# Patient Record
Sex: MALE | Race: WHITE | Employment: UNEMPLOYED | URBAN - NONMETROPOLITAN AREA
[De-identification: names, ages, dates, MRNs, and addresses within clinical notes are randomized per-mention and may not be internally consistent; named-entity substitution may affect disease eponyms.]

---

## 2023-11-02 ENCOUNTER — APPOINTMENT (OUTPATIENT)
Dept: CT IMAGING | Age: 38
End: 2023-11-02
Payer: MEDICAID

## 2023-11-02 ENCOUNTER — HOSPITAL ENCOUNTER (EMERGENCY)
Age: 38
Discharge: HOME | End: 2023-11-02
Attending: EMERGENCY MEDICINE
Payer: MEDICAID

## 2023-11-02 ENCOUNTER — APPOINTMENT (OUTPATIENT)
Dept: ULTRASOUND IMAGING | Age: 38
End: 2023-11-02
Payer: MEDICAID

## 2023-11-02 VITALS
RESPIRATION RATE: 18 BRPM | OXYGEN SATURATION: 99 % | TEMPERATURE: 98.4 F | HEIGHT: 72 IN | DIASTOLIC BLOOD PRESSURE: 67 MMHG | HEART RATE: 102 BPM | WEIGHT: 205 LBS | SYSTOLIC BLOOD PRESSURE: 119 MMHG | BODY MASS INDEX: 27.77 KG/M2

## 2023-11-02 DIAGNOSIS — Z77.29 EXPOSURE TO METHAMPHETAMINE: ICD-10-CM

## 2023-11-02 DIAGNOSIS — R07.9 CHEST PAIN, UNSPECIFIED TYPE: Primary | ICD-10-CM

## 2023-11-02 DIAGNOSIS — R17 HIGH SERUM TOTAL BILIRUBIN: ICD-10-CM

## 2023-11-02 DIAGNOSIS — F12.10 CANNABIS ABUSE: ICD-10-CM

## 2023-11-02 LAB
ALBUMIN SERPL BCG-MCNC: 4.9 G/DL (ref 3.5–5.1)
ALP SERPL-CCNC: 64 U/L (ref 38–126)
ALT SERPL W/O P-5'-P-CCNC: 30 U/L (ref 11–66)
AMPHETAMINES UR QL SCN: POSITIVE
ANION GAP SERPL CALC-SCNC: 19 MEQ/L (ref 8–16)
APAP SERPL-MCNC: < 5 UG/ML (ref 0–20)
AST SERPL-CCNC: 24 U/L (ref 5–40)
BARBITURATES UR QL SCN: NEGATIVE
BASOPHILS ABSOLUTE: 0 THOU/MM3 (ref 0–0.1)
BASOPHILS NFR BLD AUTO: 0.4 %
BENZODIAZ UR QL SCN: NEGATIVE
BILIRUB CONJ SERPL-MCNC: 0.4 MG/DL (ref 0–0.3)
BILIRUB SERPL-MCNC: 3.1 MG/DL (ref 0.3–1.2)
BILIRUB UR QL STRIP: NEGATIVE
BUN SERPL-MCNC: 22 MG/DL (ref 7–22)
BZE UR QL SCN: NEGATIVE
CALCIUM SERPL-MCNC: 10.2 MG/DL (ref 8.5–10.5)
CANNABINOIDS UR QL SCN: POSITIVE
CHARACTER UR: CLEAR
CHLORIDE SERPL-SCNC: 99 MEQ/L (ref 98–111)
CO2 SERPL-SCNC: 22 MEQ/L (ref 23–33)
COLOR UR: YELLOW
CREAT SERPL-MCNC: 1 MG/DL (ref 0.4–1.2)
D DIMER PPP IA.FEU-MCNC: 383 NG/ML FEU (ref 0–500)
DEPRECATED RDW RBC AUTO: 38.5 FL (ref 35–45)
EOSINOPHIL NFR BLD AUTO: 0.6 %
EOSINOPHILS ABSOLUTE: 0.1 THOU/MM3 (ref 0–0.4)
ERYTHROCYTE [DISTWIDTH] IN BLOOD BY AUTOMATED COUNT: 12.7 % (ref 11.5–14.5)
ETHANOL SERPL-MCNC: < 0.01 %
FENTANYL: NEGATIVE
GFR SERPL CREATININE-BSD FRML MDRD: > 60 ML/MIN/1.73M2
GLUCOSE BLD STRIP.AUTO-MCNC: 121 MG/DL (ref 70–108)
GLUCOSE BLD-MCNC: 121 MG/DL
GLUCOSE SERPL-MCNC: 122 MG/DL (ref 70–108)
GLUCOSE UR QL STRIP.AUTO: NEGATIVE MG/DL
HCT VFR BLD AUTO: 50.1 % (ref 42–52)
HGB BLD-MCNC: 17.4 GM/DL (ref 14–18)
HGB UR QL STRIP.AUTO: NEGATIVE
IMM GRANULOCYTES # BLD AUTO: 0.04 THOU/MM3 (ref 0–0.07)
IMM GRANULOCYTES NFR BLD AUTO: 0.4 %
KETONES UR QL STRIP.AUTO: 40
LACTATE SERPL-SCNC: 1 MMOL/L (ref 0.5–2)
LEUKOCYTE ESTERASE UR QL STRIP.AUTO: NEGATIVE
LIPASE SERPL-CCNC: 28 U/L (ref 5.6–51.3)
LYMPHOCYTES ABSOLUTE: 1.9 THOU/MM3 (ref 1–4.8)
LYMPHOCYTES NFR BLD AUTO: 17.3 %
MCH RBC QN AUTO: 29.1 PG (ref 26–33)
MCHC RBC AUTO-ENTMCNC: 34.7 GM/DL (ref 32.2–35.5)
MCV RBC AUTO: 83.8 FL (ref 80–94)
MONOCYTES ABSOLUTE: 0.9 THOU/MM3 (ref 0.4–1.3)
MONOCYTES NFR BLD AUTO: 7.9 %
NEUTROPHILS NFR BLD AUTO: 73.4 %
NITRITE UR QL STRIP.AUTO: NEGATIVE
NRBC BLD AUTO-RTO: 0 /100 WBC
OPIATES UR QL SCN: NEGATIVE
OSMOLALITY SERPL CALC.SUM OF ELEC: 284 MOSMOL/KG (ref 275–300)
OXYCODONE: NEGATIVE
PCP UR QL SCN: NEGATIVE
PH UR STRIP.AUTO: 6 [PH] (ref 5–9)
PLATELET # BLD AUTO: 392 THOU/MM3 (ref 130–400)
PMV BLD AUTO: 9.4 FL (ref 9.4–12.4)
POTASSIUM SERPL-SCNC: 3.7 MEQ/L (ref 3.5–5.2)
PROT SERPL-MCNC: 8 G/DL (ref 6.1–8)
PROT UR STRIP.AUTO-MCNC: NEGATIVE MG/DL
RBC # BLD AUTO: 5.98 MILL/MM3 (ref 4.7–6.1)
RPR SER QL: NONREACTIVE
SALICYLATES SERPL-MCNC: < 0.3 MG/DL (ref 2–10)
SEGMENTED NEUTROPHILS ABSOLUTE COUNT: 8.1 THOU/MM3 (ref 1.8–7.7)
SODIUM SERPL-SCNC: 140 MEQ/L (ref 135–145)
SP GR UR REFRACT.AUTO: 1.02 (ref 1–1.03)
TROPONIN, HIGH SENSITIVITY: 6 NG/L (ref 0–12)
UROBILINOGEN UR QL STRIP.AUTO: 1 EU/DL (ref 0–1)
WBC # BLD AUTO: 11.1 THOU/MM3 (ref 4.8–10.8)

## 2023-11-02 PROCEDURE — 76705 ECHO EXAM OF ABDOMEN: CPT

## 2023-11-02 PROCEDURE — 82948 REAGENT STRIP/BLOOD GLUCOSE: CPT

## 2023-11-02 PROCEDURE — 86592 SYPHILIS TEST NON-TREP QUAL: CPT

## 2023-11-02 PROCEDURE — 82077 ASSAY SPEC XCP UR&BREATH IA: CPT

## 2023-11-02 PROCEDURE — 74177 CT ABD & PELVIS W/CONTRAST: CPT

## 2023-11-02 PROCEDURE — 96361 HYDRATE IV INFUSION ADD-ON: CPT

## 2023-11-02 PROCEDURE — 2580000003 HC RX 258: Performed by: EMERGENCY MEDICINE

## 2023-11-02 PROCEDURE — 83690 ASSAY OF LIPASE: CPT

## 2023-11-02 PROCEDURE — 80143 DRUG ASSAY ACETAMINOPHEN: CPT

## 2023-11-02 PROCEDURE — 81003 URINALYSIS AUTO W/O SCOPE: CPT

## 2023-11-02 PROCEDURE — 83605 ASSAY OF LACTIC ACID: CPT

## 2023-11-02 PROCEDURE — 85379 FIBRIN DEGRADATION QUANT: CPT

## 2023-11-02 PROCEDURE — 80179 DRUG ASSAY SALICYLATE: CPT

## 2023-11-02 PROCEDURE — 84484 ASSAY OF TROPONIN QUANT: CPT

## 2023-11-02 PROCEDURE — 36415 COLL VENOUS BLD VENIPUNCTURE: CPT

## 2023-11-02 PROCEDURE — 99285 EMERGENCY DEPT VISIT HI MDM: CPT

## 2023-11-02 PROCEDURE — 96374 THER/PROPH/DIAG INJ IV PUSH: CPT

## 2023-11-02 PROCEDURE — 6360000002 HC RX W HCPCS: Performed by: EMERGENCY MEDICINE

## 2023-11-02 PROCEDURE — 93005 ELECTROCARDIOGRAM TRACING: CPT | Performed by: EMERGENCY MEDICINE

## 2023-11-02 PROCEDURE — 85025 COMPLETE CBC W/AUTO DIFF WBC: CPT

## 2023-11-02 PROCEDURE — 80307 DRUG TEST PRSMV CHEM ANLYZR: CPT

## 2023-11-02 PROCEDURE — 82248 BILIRUBIN DIRECT: CPT

## 2023-11-02 PROCEDURE — 80074 ACUTE HEPATITIS PANEL: CPT

## 2023-11-02 PROCEDURE — 6360000004 HC RX CONTRAST MEDICATION

## 2023-11-02 PROCEDURE — 80053 COMPREHEN METABOLIC PANEL: CPT

## 2023-11-02 RX ORDER — 0.9 % SODIUM CHLORIDE 0.9 %
1000 INTRAVENOUS SOLUTION INTRAVENOUS ONCE
Status: COMPLETED | OUTPATIENT
Start: 2023-11-02 | End: 2023-11-02

## 2023-11-02 RX ORDER — LORAZEPAM 2 MG/ML
1 INJECTION INTRAMUSCULAR EVERY 6 HOURS PRN
Status: DISCONTINUED | OUTPATIENT
Start: 2023-11-02 | End: 2023-11-02 | Stop reason: HOSPADM

## 2023-11-02 RX ADMIN — SODIUM CHLORIDE 1000 ML: 9 INJECTION, SOLUTION INTRAVENOUS at 10:57

## 2023-11-02 RX ADMIN — IOPAMIDOL 80 ML: 755 INJECTION, SOLUTION INTRAVENOUS at 16:36

## 2023-11-02 RX ADMIN — LORAZEPAM 1 MG: 2 INJECTION INTRAMUSCULAR; INTRAVENOUS at 10:56

## 2023-11-02 ASSESSMENT — PAIN - FUNCTIONAL ASSESSMENT
PAIN_FUNCTIONAL_ASSESSMENT: NONE - DENIES PAIN

## 2023-11-02 NOTE — ED NOTES
Patient resting in bed, respirations even and unlabored at this time. Patient denies nay pain and is updated on plan of care a this time.       Devyn Reeves RN  11/02/23 3984

## 2023-11-02 NOTE — ED NOTES
Patient resting in bed, respirations even and unlabored at this time. Patient denies any pain at this time.       Mirela Duff RN  11/02/23 1233
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
## 2023-11-02 NOTE — DISCHARGE INSTRUCTIONS
You were seen today in the ED chest pain, numbness,     You were seen to have abnormal labs that obtain to your liver. You need to have more imaging for your liver. Please follow up with family medicine tomorrow morning at 10am to follow up to get imaging scheduled as well as medications and establish care.

## 2023-11-02 NOTE — ED NOTES
Patient heart rate back to 80 BPM at this time and patient states he feels like the episode of tachycardia is over, and that he can breathe better now. Respirations even and unlabored at this time.       Praneeth Duarte RN  11/02/23 5744

## 2023-11-02 NOTE — ED NOTES
Pt resting on cot. Pt still unable to provide urine specimen. Vitals stable. Family at bedside.       Juanito Mcgowan RN  11/02/23 7209

## 2023-11-02 NOTE — ED NOTES
Patient resting in bed, respirations even and unlabored at this time. Patient denies any pain and ultrasound team at bedside at this time.       Sonya Willett RN  11/02/23 2182

## 2023-11-02 NOTE — ED NOTES
Pt reassessed at this time. Resting on cot. Vitals stable. Call light in reach.       Shama Barron RN  11/02/23 3698

## 2023-11-02 NOTE — ED NOTES
Ambulatory pulse of 99% and  at this time. Patient tolerates ambulation well.      Bell Ely RN  11/02/23 1179

## 2023-11-02 NOTE — ED NOTES
Patient resting in bed and updated on plan of care and ultrasound process at this time. No signs of distress noted.       Kathryn Ann RN  11/02/23 4164

## 2023-11-02 NOTE — ED NOTES
Patient heart rate 130 at this time and states he feels out of breath and that his heart is pulsing.      Patience Woods RN  11/02/23 6240

## 2023-11-03 ENCOUNTER — OFFICE VISIT (OUTPATIENT)
Dept: FAMILY MEDICINE CLINIC | Age: 38
End: 2023-11-03
Payer: MEDICAID

## 2023-11-03 VITALS
TEMPERATURE: 97.9 F | BODY MASS INDEX: 25.17 KG/M2 | HEIGHT: 72 IN | HEART RATE: 83 BPM | SYSTOLIC BLOOD PRESSURE: 118 MMHG | DIASTOLIC BLOOD PRESSURE: 72 MMHG | OXYGEN SATURATION: 98 % | RESPIRATION RATE: 18 BRPM | WEIGHT: 185.8 LBS

## 2023-11-03 DIAGNOSIS — R07.9 CHEST PAIN, UNSPECIFIED TYPE: ICD-10-CM

## 2023-11-03 DIAGNOSIS — I51.7 LVH (LEFT VENTRICULAR HYPERTROPHY): ICD-10-CM

## 2023-11-03 DIAGNOSIS — R06.02 SHORTNESS OF BREATH: ICD-10-CM

## 2023-11-03 DIAGNOSIS — F31.9 BIPOLAR I DISORDER (HCC): ICD-10-CM

## 2023-11-03 DIAGNOSIS — F41.9 ANXIETY: Primary | ICD-10-CM

## 2023-11-03 LAB
EKG ATRIAL RATE: 111 BPM
EKG P AXIS: -17 DEGREES
EKG P-R INTERVAL: 138 MS
EKG Q-T INTERVAL: 342 MS
EKG QRS DURATION: 108 MS
EKG QTC CALCULATION (BAZETT): 465 MS
EKG R AXIS: -24 DEGREES
EKG T AXIS: -8 DEGREES
EKG VENTRICULAR RATE: 111 BPM
HAV IGM SER QL: NEGATIVE
HBV CORE IGM SERPL QL IA: NEGATIVE
HBV SURFACE AG SERPL QL IA: NEGATIVE
HCV IGG SERPL QL IA: NEGATIVE

## 2023-11-03 PROCEDURE — 99204 OFFICE O/P NEW MOD 45 MIN: CPT

## 2023-11-03 SDOH — ECONOMIC STABILITY: FOOD INSECURITY: WITHIN THE PAST 12 MONTHS, YOU WORRIED THAT YOUR FOOD WOULD RUN OUT BEFORE YOU GOT MONEY TO BUY MORE.: NEVER TRUE

## 2023-11-03 SDOH — ECONOMIC STABILITY: HOUSING INSECURITY
IN THE LAST 12 MONTHS, WAS THERE A TIME WHEN YOU DID NOT HAVE A STEADY PLACE TO SLEEP OR SLEPT IN A SHELTER (INCLUDING NOW)?: YES

## 2023-11-03 SDOH — ECONOMIC STABILITY: FOOD INSECURITY: WITHIN THE PAST 12 MONTHS, THE FOOD YOU BOUGHT JUST DIDN'T LAST AND YOU DIDN'T HAVE MONEY TO GET MORE.: NEVER TRUE

## 2023-11-03 SDOH — ECONOMIC STABILITY: INCOME INSECURITY: HOW HARD IS IT FOR YOU TO PAY FOR THE VERY BASICS LIKE FOOD, HOUSING, MEDICAL CARE, AND HEATING?: SOMEWHAT HARD

## 2023-11-03 ASSESSMENT — PATIENT HEALTH QUESTIONNAIRE - PHQ9
3. TROUBLE FALLING OR STAYING ASLEEP: 2
SUM OF ALL RESPONSES TO PHQ QUESTIONS 1-9: 17
2. FEELING DOWN, DEPRESSED OR HOPELESS: 2
8. MOVING OR SPEAKING SO SLOWLY THAT OTHER PEOPLE COULD HAVE NOTICED. OR THE OPPOSITE, BEING SO FIGETY OR RESTLESS THAT YOU HAVE BEEN MOVING AROUND A LOT MORE THAN USUAL: 2
9. THOUGHTS THAT YOU WOULD BE BETTER OFF DEAD, OR OF HURTING YOURSELF: 2
5. POOR APPETITE OR OVEREATING: 2
SUM OF ALL RESPONSES TO PHQ QUESTIONS 1-9: 15
1. LITTLE INTEREST OR PLEASURE IN DOING THINGS: 2
4. FEELING TIRED OR HAVING LITTLE ENERGY: 3
10. IF YOU CHECKED OFF ANY PROBLEMS, HOW DIFFICULT HAVE THESE PROBLEMS MADE IT FOR YOU TO DO YOUR WORK, TAKE CARE OF THINGS AT HOME, OR GET ALONG WITH OTHER PEOPLE: 2
SUM OF ALL RESPONSES TO PHQ QUESTIONS 1-9: 17
SUM OF ALL RESPONSES TO PHQ9 QUESTIONS 1 & 2: 4
6. FEELING BAD ABOUT YOURSELF - OR THAT YOU ARE A FAILURE OR HAVE LET YOURSELF OR YOUR FAMILY DOWN: 2
SUM OF ALL RESPONSES TO PHQ QUESTIONS 1-9: 17
7. TROUBLE CONCENTRATING ON THINGS, SUCH AS READING THE NEWSPAPER OR WATCHING TELEVISION: 0

## 2023-11-03 ASSESSMENT — COLUMBIA-SUICIDE SEVERITY RATING SCALE - C-SSRS
2. HAVE YOU ACTUALLY HAD ANY THOUGHTS OF KILLING YOURSELF?: YES
3. HAVE YOU BEEN THINKING ABOUT HOW YOU MIGHT KILL YOURSELF?: NO
1. WITHIN THE PAST MONTH, HAVE YOU WISHED YOU WERE DEAD OR WISHED YOU COULD GO TO SLEEP AND NOT WAKE UP?: NO

## 2023-11-03 NOTE — PROGRESS NOTES
I saw and evaluated the patient, performing the key elements of the service. I discussed the findings, assessment and plan with the resident and agree with the resident's findings and plan as documented in the resident's note. GC modifier added.

## 2023-11-03 NOTE — PROGRESS NOTES
Carmelita Velasco is a 41 y.o. male who presents today for:  Chief Complaint   Patient presents with    New Patient     Chest pain, lower back pain that started today. Occasionally has numbness in finger tips and face. Pt girlfriend had to rub his chest to \"bring him back\". ER says they are concerned of liver, looks to be cancerous and kidney concerns      HPI:   Carmelita Velasco is 41 y.o. who presents today to establish care in the office today. He moved to [place] from [place] 3 weeks ago. He reports having a significant history of psychiatric conditions for which he was being treated in the past.  However, reports that all medical care was withdrawn about 2 years ago as he was told\" that he had too much information about his conditions and so did not need treatment anymore\". Past psych hx includes: significant hx of Bipolar I, Anxiety, ?schizophrenia -- treated with Clonopin, seroquel, depakote, Triliptal, Tegretol. Currently, trying to schedule appt at [facility] to follow up on chronic psychiatric conditions. At this time, pt reports chronic suicidal ideation with no active plan. He reports that rather than having an active suicidal plan, he Davida Campbell wants to see what's there on the other side\". Of note, he also reports multiple prior hospitalizations for suicidal ideation, since childhood. Chest pain and shortness of breath -- pt c/o chronic symptoms x 1 month. Pt visited the ED on 11/2 showed negative ACS workup -- troponin WNL, EKG showed sinus tachycardia, moderate LVH and incomplete RBBB. CXR showed no acute cardiopulmonary findings. Pt's cinthya reports episode of syncope at home, when she had to \"bring back\" the pt by \"rubbing on his chest\". Also reports having used his fiance's steroid and rescue inhalers for about 3 months, that has helped with the SOB somewhat.     Of note, CT chest done previously at outside facility showed Granulomatous changes with coarse calcifications in the

## 2023-11-05 PROBLEM — R06.02 SHORTNESS OF BREATH: Status: RESOLVED | Noted: 2023-11-05 | Resolved: 2023-11-05

## 2023-11-05 PROBLEM — I51.7 LVH (LEFT VENTRICULAR HYPERTROPHY): Status: RESOLVED | Noted: 2023-11-05 | Resolved: 2023-11-05

## 2023-11-05 PROBLEM — R07.9 CHEST PAIN: Status: RESOLVED | Noted: 2023-11-05 | Resolved: 2023-11-05

## 2023-11-05 PROBLEM — R06.02 SHORTNESS OF BREATH: Status: ACTIVE | Noted: 2023-11-05

## 2023-11-05 PROBLEM — I51.7 LVH (LEFT VENTRICULAR HYPERTROPHY): Status: ACTIVE | Noted: 2023-11-05

## 2023-11-05 PROBLEM — R07.9 CHEST PAIN: Status: ACTIVE | Noted: 2023-11-05

## 2023-11-08 ENCOUNTER — TELEPHONE (OUTPATIENT)
Dept: FAMILY MEDICINE CLINIC | Age: 38
End: 2023-11-08

## 2023-11-08 NOTE — TELEPHONE ENCOUNTER
Called pt to inform him that [facility]  sent over a new prior auth form to complete and have faxed back because ours that was faxed over 11/3/2023 did not meet their requirements. Pt asked to have this mailed to him and will bring back into office to be faxed off.

## 2023-12-13 ENCOUNTER — TELEPHONE (OUTPATIENT)
Dept: FAMILY MEDICINE CLINIC | Age: 38
End: 2023-12-13

## 2023-12-13 NOTE — TELEPHONE ENCOUNTER
Pt currently on the schedule for an ECHO 12/14 and pt has [employer] [place] Medicaid and would be an out of network insurance for [facility]. These services will need canceled until pt changes insurance as this will be an uncovered benefit for the member. An attempt has been made to notify the pt. LM on pt's VM

## 2024-01-08 PROBLEM — Z90.49 STATUS POST CHOLECYSTECTOMY: Status: ACTIVE | Noted: 2021-10-03

## 2024-01-08 PROBLEM — R11.2 NAUSEA AND VOMITING IN ADULT: Status: ACTIVE | Noted: 2022-09-27

## 2024-01-08 PROBLEM — R19.7 DIARRHEA: Status: ACTIVE | Noted: 2022-09-27

## 2024-01-08 PROBLEM — R79.89 ELEVATED LFTS: Status: ACTIVE | Noted: 2021-10-03

## 2024-01-08 PROBLEM — F31.9 BIPOLAR 1 DISORDER (HCC): Status: ACTIVE | Noted: 2021-10-03

## 2024-01-08 PROBLEM — R10.30 LOWER ABDOMINAL PAIN: Status: ACTIVE | Noted: 2022-09-27

## 2024-01-08 PROBLEM — K92.1 HEMATOCHEZIA: Status: ACTIVE | Noted: 2022-09-27

## 2024-01-08 PROBLEM — K81.0 ACUTE CHOLECYSTITIS: Status: ACTIVE | Noted: 2021-10-02

## 2024-01-08 PROBLEM — R19.4 CHANGE IN BOWEL HABIT: Status: ACTIVE | Noted: 2022-09-27

## 2024-01-08 PROBLEM — R63.4 UNINTENTIONAL WEIGHT LOSS: Status: ACTIVE | Noted: 2022-09-27
